# Patient Record
Sex: FEMALE | Race: WHITE | NOT HISPANIC OR LATINO
[De-identification: names, ages, dates, MRNs, and addresses within clinical notes are randomized per-mention and may not be internally consistent; named-entity substitution may affect disease eponyms.]

---

## 2017-01-11 ENCOUNTER — APPOINTMENT (OUTPATIENT)
Dept: INTERNAL MEDICINE | Facility: CLINIC | Age: 82
End: 2017-01-11

## 2021-04-13 ENCOUNTER — EMERGENCY (EMERGENCY)
Facility: HOSPITAL | Age: 86
LOS: 1 days | Discharge: LEFT BEFORE DISCHARGE | End: 2021-04-13
Attending: EMERGENCY MEDICINE | Admitting: EMERGENCY MEDICINE
Payer: MEDICARE

## 2021-04-13 VITALS
OXYGEN SATURATION: 96 % | HEIGHT: 68 IN | SYSTOLIC BLOOD PRESSURE: 184 MMHG | WEIGHT: 132.28 LBS | TEMPERATURE: 98 F | DIASTOLIC BLOOD PRESSURE: 102 MMHG | RESPIRATION RATE: 20 BRPM | HEART RATE: 110 BPM

## 2021-04-13 VITALS — DIASTOLIC BLOOD PRESSURE: 77 MMHG | HEART RATE: 104 BPM | OXYGEN SATURATION: 95 % | SYSTOLIC BLOOD PRESSURE: 156 MMHG

## 2021-04-13 LAB
ALBUMIN SERPL ELPH-MCNC: 3.6 G/DL — SIGNIFICANT CHANGE UP (ref 3.3–5)
ALP SERPL-CCNC: 108 U/L — SIGNIFICANT CHANGE UP (ref 30–120)
ALT FLD-CCNC: 15 U/L DA — SIGNIFICANT CHANGE UP (ref 10–60)
ANION GAP SERPL CALC-SCNC: 9 MMOL/L — SIGNIFICANT CHANGE UP (ref 5–17)
APTT BLD: 33.9 SEC — SIGNIFICANT CHANGE UP (ref 27.5–35.5)
AST SERPL-CCNC: 25 U/L — SIGNIFICANT CHANGE UP (ref 10–40)
BASE EXCESS BLDV CALC-SCNC: 1.5 MMOL/L — SIGNIFICANT CHANGE UP (ref -2–2)
BASOPHILS # BLD AUTO: 0.01 K/UL — SIGNIFICANT CHANGE UP (ref 0–0.2)
BASOPHILS NFR BLD AUTO: 0.1 % — SIGNIFICANT CHANGE UP (ref 0–2)
BILIRUB SERPL-MCNC: 0.6 MG/DL — SIGNIFICANT CHANGE UP (ref 0.2–1.2)
BUN SERPL-MCNC: 13 MG/DL — SIGNIFICANT CHANGE UP (ref 7–23)
CALCIUM SERPL-MCNC: 9.4 MG/DL — SIGNIFICANT CHANGE UP (ref 8.4–10.5)
CHLORIDE SERPL-SCNC: 103 MMOL/L — SIGNIFICANT CHANGE UP (ref 96–108)
CO2 SERPL-SCNC: 26 MMOL/L — SIGNIFICANT CHANGE UP (ref 22–31)
CREAT SERPL-MCNC: 0.86 MG/DL — SIGNIFICANT CHANGE UP (ref 0.5–1.3)
EOSINOPHIL # BLD AUTO: 0 K/UL — SIGNIFICANT CHANGE UP (ref 0–0.5)
EOSINOPHIL NFR BLD AUTO: 0 % — SIGNIFICANT CHANGE UP (ref 0–6)
GAS PNL BLDV: SIGNIFICANT CHANGE UP
GLUCOSE SERPL-MCNC: 138 MG/DL — HIGH (ref 70–99)
HCO3 BLDV-SCNC: 25 MMOL/L — SIGNIFICANT CHANGE UP (ref 21–29)
HCT VFR BLD CALC: 41.7 % — SIGNIFICANT CHANGE UP (ref 34.5–45)
HGB BLD-MCNC: 13.5 G/DL — SIGNIFICANT CHANGE UP (ref 11.5–15.5)
IMM GRANULOCYTES NFR BLD AUTO: 0.3 % — SIGNIFICANT CHANGE UP (ref 0–1.5)
INR BLD: 1.04 RATIO — SIGNIFICANT CHANGE UP (ref 0.88–1.16)
LYMPHOCYTES # BLD AUTO: 0.51 K/UL — LOW (ref 1–3.3)
LYMPHOCYTES # BLD AUTO: 5.6 % — LOW (ref 13–44)
MCHC RBC-ENTMCNC: 29.1 PG — SIGNIFICANT CHANGE UP (ref 27–34)
MCHC RBC-ENTMCNC: 32.4 GM/DL — SIGNIFICANT CHANGE UP (ref 32–36)
MCV RBC AUTO: 89.9 FL — SIGNIFICANT CHANGE UP (ref 80–100)
MONOCYTES # BLD AUTO: 0.55 K/UL — SIGNIFICANT CHANGE UP (ref 0–0.9)
MONOCYTES NFR BLD AUTO: 6.1 % — SIGNIFICANT CHANGE UP (ref 2–14)
NEUTROPHILS # BLD AUTO: 7.95 K/UL — HIGH (ref 1.8–7.4)
NEUTROPHILS NFR BLD AUTO: 87.9 % — HIGH (ref 43–77)
NRBC # BLD: 0 /100 WBCS — SIGNIFICANT CHANGE UP (ref 0–0)
PCO2 BLDV: 41 MMHG — SIGNIFICANT CHANGE UP (ref 39–42)
PH BLDV: 7.4 — SIGNIFICANT CHANGE UP (ref 7.35–7.45)
PLATELET # BLD AUTO: 274 K/UL — SIGNIFICANT CHANGE UP (ref 150–400)
PO2 BLDV: 48 MMHG — HIGH (ref 25–45)
POTASSIUM SERPL-MCNC: 4.1 MMOL/L — SIGNIFICANT CHANGE UP (ref 3.5–5.3)
POTASSIUM SERPL-SCNC: 4.1 MMOL/L — SIGNIFICANT CHANGE UP (ref 3.5–5.3)
PROT SERPL-MCNC: 7.6 G/DL — SIGNIFICANT CHANGE UP (ref 6–8.3)
PROTHROM AB SERPL-ACNC: 12.6 SEC — SIGNIFICANT CHANGE UP (ref 10.6–13.6)
RBC # BLD: 4.64 M/UL — SIGNIFICANT CHANGE UP (ref 3.8–5.2)
RBC # FLD: 13.1 % — SIGNIFICANT CHANGE UP (ref 10.3–14.5)
SAO2 % BLDV: 83 % — SIGNIFICANT CHANGE UP (ref 67–88)
SARS-COV-2 RNA SPEC QL NAA+PROBE: SIGNIFICANT CHANGE UP
SODIUM SERPL-SCNC: 138 MMOL/L — SIGNIFICANT CHANGE UP (ref 135–145)
TROPONIN I SERPL-MCNC: 0.01 NG/ML — LOW (ref 0.02–0.06)
WBC # BLD: 9.05 K/UL — SIGNIFICANT CHANGE UP (ref 3.8–10.5)
WBC # FLD AUTO: 9.05 K/UL — SIGNIFICANT CHANGE UP (ref 3.8–10.5)

## 2021-04-13 PROCEDURE — 71045 X-RAY EXAM CHEST 1 VIEW: CPT

## 2021-04-13 PROCEDURE — 70498 CT ANGIOGRAPHY NECK: CPT

## 2021-04-13 PROCEDURE — 99285 EMERGENCY DEPT VISIT HI MDM: CPT | Mod: 25

## 2021-04-13 PROCEDURE — 82962 GLUCOSE BLOOD TEST: CPT

## 2021-04-13 PROCEDURE — 85025 COMPLETE CBC W/AUTO DIFF WBC: CPT

## 2021-04-13 PROCEDURE — 70450 CT HEAD/BRAIN W/O DYE: CPT | Mod: 26,MA

## 2021-04-13 PROCEDURE — 84484 ASSAY OF TROPONIN QUANT: CPT

## 2021-04-13 PROCEDURE — 70498 CT ANGIOGRAPHY NECK: CPT | Mod: 26,MA

## 2021-04-13 PROCEDURE — 93005 ELECTROCARDIOGRAM TRACING: CPT

## 2021-04-13 PROCEDURE — 99285 EMERGENCY DEPT VISIT HI MDM: CPT | Mod: CS

## 2021-04-13 PROCEDURE — 70496 CT ANGIOGRAPHY HEAD: CPT

## 2021-04-13 PROCEDURE — 71045 X-RAY EXAM CHEST 1 VIEW: CPT | Mod: 26

## 2021-04-13 PROCEDURE — 82803 BLOOD GASES ANY COMBINATION: CPT

## 2021-04-13 PROCEDURE — 87635 SARS-COV-2 COVID-19 AMP PRB: CPT

## 2021-04-13 PROCEDURE — 93010 ELECTROCARDIOGRAM REPORT: CPT

## 2021-04-13 PROCEDURE — 70450 CT HEAD/BRAIN W/O DYE: CPT

## 2021-04-13 PROCEDURE — 70496 CT ANGIOGRAPHY HEAD: CPT | Mod: 26,MA

## 2021-04-13 PROCEDURE — 80053 COMPREHEN METABOLIC PANEL: CPT

## 2021-04-13 PROCEDURE — 36415 COLL VENOUS BLD VENIPUNCTURE: CPT

## 2021-04-13 PROCEDURE — 85610 PROTHROMBIN TIME: CPT

## 2021-04-13 PROCEDURE — 85730 THROMBOPLASTIN TIME PARTIAL: CPT

## 2021-04-13 RX ORDER — ASPIRIN/CALCIUM CARB/MAGNESIUM 324 MG
81 TABLET ORAL DAILY
Refills: 0 | Status: DISCONTINUED | OUTPATIENT
Start: 2021-04-13 | End: 2021-04-17

## 2021-04-13 RX ORDER — LEVOTHYROXINE SODIUM 125 MCG
1 TABLET ORAL
Qty: 0 | Refills: 0 | DISCHARGE

## 2021-04-13 RX ORDER — ERGOCALCIFEROL 1.25 MG/1
1 CAPSULE ORAL
Qty: 0 | Refills: 0 | DISCHARGE

## 2021-04-13 RX ORDER — ATORVASTATIN CALCIUM 80 MG/1
40 TABLET, FILM COATED ORAL AT BEDTIME
Refills: 0 | Status: DISCONTINUED | OUTPATIENT
Start: 2021-04-13 | End: 2021-04-17

## 2021-04-13 RX ORDER — IRBESARTAN 75 MG/1
1 TABLET ORAL
Qty: 0 | Refills: 0 | DISCHARGE

## 2021-04-13 NOTE — ED ADULT NURSE NOTE - NS TRANSFER PATIENT BELONGINGS
Clothing 1 silver necklace with rocks given to security (nigel #[id number])/Jewelry/Money (specify)/Clothing

## 2021-04-13 NOTE — ED PROVIDER NOTE - PROGRESS NOTE DETAILS
Seen by Oseas. Recommends admission for further stroke workup. Dr. Mayers covering Dr Perkins aware and agrees to admit. Pts 2 sons are attempting to arrange transfer to [place] where her cancer specialists are.  for [facility] says no beds at [place]. Recommends admitting here. Spoke with Dr Castro at [place], stroke neurologist who agrees that pt would not benefit from transfer at this time. Pts sons both insist on signing pt out AMA from [facility] and bringing pt to [place] for further care. Nurse navas spoke to Pts sons at length as well and could not deter them from AMA. They both understand risks of leaving AMA including death. The patients sons have decided to leave against medical advice (AMA).  I have made reasonable attempts to explain  that leaving prior to completion of work up and treatment may result in recurrent or worsening of symptoms, severe permanent disability, pain and suffering, harm, injury, and/or death.  I have explained the risks, benefits, and alternatives to treatment as well as the attendant risks of refusing treatment at this time.  The patients sons have demonstrated comprehension, verbalize understanding of these risks, have been told that they must return to the ER immediately for persistent, recurring, worsening or any concerning symptoms and that they may return to the ER immediately at any time if they change their mind and wish to resume care. The patients sons have been given the opportunity to ask questions and have them fully answered.

## 2021-04-13 NOTE — CONSULT NOTE ADULT - ASSESSMENT
87 y/o F was found at home with right forehead injury and not able to speak.  Son spoke to her last night at 11.30 pm and had normal conversation.  CT Head - No acute pathology. Multiple old Infarcts. Possible old/subacute lacunar infarct in the left thalamus.   NIHSS - 10 but is limited.  Not a tPa candidate  - Last well known time last night at 11.30 pm.  Admit to monitored bed.  Dysphagia eval in ED.  Ecotrin 81/Lipitor 40  MRI Brain/Carotid doppler/2D Echo.  Lipid panel/HbA1c.  PT Eval/Speech and swallowing eval.  D/w ED physician Dr. Thornton.  D/w son in waiting area. I spoke to him at length. He has understanding that all the w/up including MRI brain should be done now. He also was asking for her to be transferred to [facility]. Counseling was done. I refer to ED physician for any possibility of transfer.    Would continue to follow.

## 2021-04-13 NOTE — ED PROVIDER NOTE - UNABLE TO OBTAIN
Dementia History limited given pt presenting with signs and symptoms consistent with CVA Pt presenting with signs and symptoms consistent with CVA

## 2021-04-13 NOTE — ED PROVIDER NOTE - OBJECTIVE STATEMENT
87 y/o female with PMHx of Breast CA and CVA 30 years ago with no residual deficits presents to the ED BIBEMS from home c/o AMS. Per son, last known well of pt 11:30pm last night. At 12:30pm, pt found by son on floor. Pt currently p/w right eye bruising and skin tear to right arm. Pt also found to have aphasia, which prompted EMS call. Further history limited given pt p/w stroke signs and symptoms.

## 2021-04-13 NOTE — ED PROVIDER NOTE - CONSTITUTIONAL, MLM
blood pressure 184/102, afebrile, heart rate 110 bpm. afebrile, no acute distress. +contusion over right eyebrow normal...

## 2021-04-13 NOTE — CONSULT NOTE ADULT - SUBJECTIVE AND OBJECTIVE BOX
Patient is a 86y old  Female who presents with a chief complaint of Fall/Head Injury, Not able to speak    HPI:  87 y/o female with PMHx of Breast CA and CVA 30 years ago with no residual deficits presents to the ED BIBEMS from home c/o AMS. Per son, last known well of pt 11:30pm last night. At 12:30pm, pt found by son on floor. Pt currently p/w right forehead and around right eye bruising and skin tear to right arm. Pt also found to have aphasia, which prompted EMS call. Further history limited given pt p/w stroke signs and symptoms.  No lateralized weakness.  NIHSS - 10 but is limited.  Not a tPa candidate  - Last well known time last night at 11.30 pm.  Son is visiting her from [place].  Pt is normally fully able to conversate and as per son has no underlying dementia.    PAST MEDICAL/SURGICAL/FAMILY/SOCIAL HISTORY:    Past Medical History:  Breast CA    CVA (cerebral vascular accident).    Home Medications:   * Outpatient Medication Status not yet specified    Allergies    Allergy Status Unknown    SOCIAL HISTORY:    No h/o Smoking.   No h/o alcohol use.    FAMILY HISTORY:      REVIEW OF SYSTEMS: UTO      PHYSICAL EXAM:  Vital Signs Last 24 Hrs  T(F): 98.3 (04-13-21 @ 13:30)  HR: 104 (04-13-21 @ 13:56)  BP: 156/77 (04-13-21 @ 13:56)  RR: 20 (04-13-21 @ 13:30)    On Neurological Examination:    Mental Status - Pt is alert, awake. Makes eye contact.   Follows simple commands.    Speech -  Doesn't talk. Just keep looking.    Cranial Nerves - Pupils 3 mm equal and reactive to light.  Pt has no facial asymmetry. Tongue - is in midline.    Motor Exam - 4 plus/5 all over, No drift. No shaking or tremors.     Sensory Exam -  Pt withdraws all extremities equally on stimulation.    Gait - To be tested with PT.    Deep tendon Reflexes - 2 plus all over.    Coordination - No asymmetry is seen.       Neck Supple -  Yes.    LABS:                        13.5   9.05  )-----------( 274      ( 13 Apr 2021 13:54 )             41.7     04-13    138  |  103  |  13  ----------------------------<  138<H>  4.1   |  26  |  0.86    Ca    9.4      13 Apr 2021 13:54    TPro  7.6  /  Alb  3.6  /  TBili  0.6  /  DBili  x   /  AST  25  /  ALT  15  /  AlkPhos  108  04-13    PT/INR - ( 13 Apr 2021 13:54 )   PT: 12.6 sec;   INR: 1.04 ratio      PTT - ( 13 Apr 2021 13:54 )  PTT:33.9 sec    RADIOLOGY & ADDITIONAL STUDIES:    < from: CT Brain Stroke Protocol (04.13.21 @ 13:38) >    HEAD CT: Mild volume loss, microvascular disease, no acute hemorrhage or midline shift.  Multiple bilateral old appearing lacunar infarcts in and around the basal ganglia. 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  Possible old small subacute lacunar infarct in the left thalamus. Right periorbital soft tissue swelling. No underlying fracture.    < end of copied text >

## 2021-04-13 NOTE — ED ADULT NURSE NOTE - CHIEF COMPLAINT QUOTE
found on  floor aphasic  spoke with son pt was fine last night and went to bed  today went to check on mom shortly before ED arrival  and found her on floor aphasic with black eye and bleeding right arm

## 2021-04-13 NOTE — ED PROVIDER NOTE - PATIENT PORTAL LINK FT
You can access the FollowMyHealth Patient Portal offered by [facility] by registering at the following website: [email or web address]. By joining [employer]’s FollowMyHealth portal, you will also be able to view your health information using other applications (apps) compatible with our system.

## 2021-04-13 NOTE — ED ADULT TRIAGE NOTE - CHIEF COMPLAINT QUOTE
found on  floor aphasic found on  floor aphasic  spoke with son pt was fine last night and went to bed  today went to check on mom shortly before ED arrival  and found her on floor aphasic with black eye and bleeding right arm

## 2021-04-13 NOTE — ED ADULT NURSE NOTE - OBJECTIVE STATEMENT
Order for HIDA w/o CCK placed.    85 y/o female received as stroke code for eval. per EMS, pt was found on the floor today at noon by son, last see normal was last night before bed. pt currently awake and alert, cooperative, follows verbal commands but is non verbal. ecchymosis and swelling noted to right side of face, eye and right wrist.

## 2021-04-13 NOTE — ED PROVIDER NOTE - CLINICAL SUMMARY MEDICAL DECISION MAKING FREE TEXT BOX
87 y/o female found to be aphasic, possibly fell during night, last known well 13 hours ago. PLAN: Stroke workup. Pt is not a TPA candidate at this time.

## 2021-04-13 NOTE — ED ADULT NURSE REASSESSMENT NOTE - NS ED NURSE REASSESS COMMENT FT1
Patients lanie Meyers and Bhavin requesting to take patient out of [place] and drive [facility] to continue with all of the patients care. As per lanie the all of the patients doctors are at [place]. ED attending explained the risks of signing patient out AMA. Son's continued to request AMA. ED attending made multiple calls to get patient to [place], unsuccessful. Lanie made aware that there are no beds in [place] at this time and that the patient may end up in the ED over night at [place]. Luigi at patients beside encouraged leave patient at [place]. Lanie deliberated and decided it would be better if patient went to the hospital where all of her doctors are. Bipin signed patient out AMA. Assisted out via wheelchair and helped into the car. Valuables given to bipin.

## 2021-04-13 NOTE — ED PROVIDER NOTE - SKIN, MLM
Skin normal color for race, warm, dry +skin tear noted on right forearm with dried blood. Skin normal color for race, warm, dry +skin tear noted on right forearm with dried blood. +Ulceration on left lower leg, with granulation tissue present and some surrounding erythema. No drainage or cellulitis.

## 2021-04-13 NOTE — ED ADULT NURSE NOTE - NSIMPLEMENTINTERV_GEN_ALL_ED
Implemented All Fall with Harm Risk Interventions:  [place] to call system. Call bell, personal items and telephone within reach. Instruct patient to call for assistance. Room bathroom lighting operational. Non-slip footwear when patient is off stretcher. Physically safe environment: no spills, clutter or unnecessary equipment. Stretcher in lowest position, wheels locked, appropriate side rails in place. Provide visual cue, wrist band, yellow gown, etc. Monitor gait and stability. Monitor for mental status changes and reorient to person, place, and time. Review medications for side effects contributing to fall risk. Reinforce activity limits and safety measures with patient and family. Provide visual clues: red socks.